# Patient Record
Sex: FEMALE | Race: WHITE | NOT HISPANIC OR LATINO
[De-identification: names, ages, dates, MRNs, and addresses within clinical notes are randomized per-mention and may not be internally consistent; named-entity substitution may affect disease eponyms.]

---

## 2024-04-30 ENCOUNTER — APPOINTMENT (OUTPATIENT)
Dept: OTOLARYNGOLOGY | Facility: CLINIC | Age: 34
End: 2024-04-30
Payer: COMMERCIAL

## 2024-04-30 VITALS
WEIGHT: 120 LBS | HEART RATE: 87 BPM | TEMPERATURE: 99.8 F | SYSTOLIC BLOOD PRESSURE: 119 MMHG | OXYGEN SATURATION: 98 % | BODY MASS INDEX: 19.99 KG/M2 | HEIGHT: 65 IN | DIASTOLIC BLOOD PRESSURE: 79 MMHG

## 2024-04-30 DIAGNOSIS — R09.89 OTHER SPECIFIED SYMPTOMS AND SIGNS INVOLVING THE CIRCULATORY AND RESPIRATORY SYSTEMS: ICD-10-CM

## 2024-04-30 DIAGNOSIS — H61.21 IMPACTED CERUMEN, RIGHT EAR: ICD-10-CM

## 2024-04-30 DIAGNOSIS — J30.9 ALLERGIC RHINITIS, UNSPECIFIED: ICD-10-CM

## 2024-04-30 DIAGNOSIS — Z78.9 OTHER SPECIFIED HEALTH STATUS: ICD-10-CM

## 2024-04-30 DIAGNOSIS — K21.9 GASTRO-ESOPHAGEAL REFLUX DISEASE W/OUT ESOPHAGITIS: ICD-10-CM

## 2024-04-30 DIAGNOSIS — R05.3 CHRONIC COUGH: ICD-10-CM

## 2024-04-30 PROBLEM — Z00.00 ENCOUNTER FOR PREVENTIVE HEALTH EXAMINATION: Status: ACTIVE | Noted: 2024-04-30

## 2024-04-30 PROCEDURE — 99205 OFFICE O/P NEW HI 60 MIN: CPT | Mod: 25

## 2024-04-30 PROCEDURE — 31231 NASAL ENDOSCOPY DX: CPT | Mod: 59

## 2024-04-30 PROCEDURE — 69210 REMOVE IMPACTED EAR WAX UNI: CPT | Mod: RT

## 2024-04-30 PROCEDURE — 31575 DIAGNOSTIC LARYNGOSCOPY: CPT

## 2024-04-30 RX ORDER — AZELASTINE HYDROCHLORIDE 137 UG/1
137 SPRAY, METERED NASAL TWICE DAILY
Qty: 3 | Refills: 3 | Status: ACTIVE | COMMUNITY
Start: 2024-04-30 | End: 1900-01-01

## 2024-04-30 RX ORDER — FLUTICASONE PROPIONATE 50 UG/1
50 SPRAY, METERED NASAL DAILY
Qty: 3 | Refills: 3 | Status: ACTIVE | COMMUNITY
Start: 2024-04-30 | End: 1900-01-01

## 2024-04-30 NOTE — PHYSICAL EXAM
[Nasal Endoscopy Performed] : nasal endoscopy was performed, see procedure section for findings [] : septum deviated to the right [Midline] : trachea located in midline position [Laryngoscopy Performed] : laryngoscopy was performed, see procedure section for findings [FreeTextEntry8] : partial cerumen impaction, removed with curette  [Normal] : tympanic membranes are normal in both ears [de-identified] : right sided cerumen removed without issue

## 2024-04-30 NOTE — ASSESSMENT
[FreeTextEntry1] : - 5/30/24 35yo female who presents with concern for chronic and persistent chest congestion along with cough.  I do believe that her symptoms are multifactorial.  Based on history and physical exam, I do believe there is a component of laryngopharyngeal reflux.  At this time I am recommending dietary and behavioral change to reduce acid in the diet.  I am also recommending omeprazole as well famotidine for a 3 months trial. I am also recommending regimented use of nasal saline, and nasal azelastine to help with PND.  Additionally we discussed increase hydration and cough avoidance.   I also believe that pulm consultation would be prudent.  Recommending consult with Eric/Bimal for pulm eval, pft and possible bronchoscopy.  - Dietary and behavioral modification to reduce acid reflux, handout given - Omeprazole qd as well as Famotidine qhs - Voice hygiene, increase hydration, sips of water throughout the day, avoid throat clearing - fu 3wk mo for eval of reflux -nasal saline rinses/flonase/azelastine - fu 6wks for eval of nasal symptoms -referral for pulmonology

## 2024-04-30 NOTE — PROCEDURE
[FreeTextEntry3] : - Cerumen Removal/Ear Cleaning for Otitis Externa Pre-operative Diagnosis: Right Cerumen Impaction Post-operative Diagnosis: Same Procedure:  Binocular microscopy with cerumen removal- [id number] Procedure Details:   The patient was placed in the supine position.  The operating microscope was positioned.  I then placed the ear speculum in the Right EAC.  Cerumen was then removed using a mixture of otologic curettes, and suction.  The TM was noted to be intact.  The patient tolerated procedure well.  Findings:  Bilateral Ear Canal - normal Bilateral Tympanic Membrane - normal  Recommendations: Debrox Complications: None  [FreeTextEntry6] : - Procedure Note    Pre-operative Diagnosis: PND Post-operative Diagnosis: +allergic rhinitis Anesthesia: Topical Procedure: Bilateral nasal endoscopy    Procedure Details:  After topical anesthesia and decongestant, the patient was placed in the supine position. The telescope was passed along the left nasal floor to the nasopharynx. It was then passed into the region of the middle meatus, middle turbinate, and the sphenoethmoid region.  An identical procedure was performed on the right side.     Findings:  Mucosa: 	                allergic rhinitis	 Nasal septum: 	midline 	 Discharge: 	none	 Turbinates: 	normal	 Adenoid: 	                normal	 Posterior choanae: 	normal	 Eustachian tubes: 	normal	 Mucous stranding: 	normal 	 Lesions: 	                Not present	    Comments:  Condition: Stable. Patient tolerated procedure well. Complications: None  [de-identified] : - Pre-operative Diagnosis:  Throat discomfort Post-operative Diagnosis:  + postcricoid edema, erythema vocal process Anesthesia: Topical - 1 % Lidocaine/Phenylephrine Procedure:  Flexible Laryngoscopy   Procedure Details:   The patient was placed in the sitting position.  After decongestant and anesthesia were applied the laryngoscope was passed.  The nasal cavities, nasopharynx, oropharynx, hypopharynx, and larynx were all examined.  Vocal folds were examined during respiration and phonation.  The following findings were noted:  Findings:   Nose: Septum is midline, turbinates are normal, nasal airways patent, mucosa normal Nasopharynx: Adenoids normal, no masses, eustachian tube normal Oropharynx: Pharyngeal walls symmetric and without lesion. Tonsils/fossae symmetric Hypopharynx: Hypopharynx and pyriform sinuses without lesion. No masses or asymmetry.  No pooling of secretions. Larynx:  Epiglottis and aryepiglottic folds were sharp and crisp bilaterally.  Bilateral false and true vocal folds normal appearance. Bilateral vocal folds fully mobile and symmetric.  Airway was widely patent. + postcricoid edema, erythema vocal process  Condition: Stable.  Patient tolerated procedure well.  Complications: None

## 2024-04-30 NOTE — HISTORY OF PRESENT ILLNESS
[de-identified] : - 4/30/24: 34yF p/w persistent chest congestion. Went to an ENT as a child, testing neg for CF and allergies. Reported that mucus was abnormally thick with sig enlarged adenoids. Had adenoids removed around age of 11, continued to have sx afterwards.   Occasional SOB and increased hoarseness. No issues chewing, eating, or swallowing although food can make it worse. No issues with taste or smell. Reports occasional clear nasal drainage and coughs up thick mucus, episodic. Frequent throat clearing. Regularly takes mucinex, reports helps sx. Took Nexium for about 6 months with no notable improvement. Has never seen a pulmonologist.    Diet: + 2.5c coffee, chocolate couple times a week, citrus twice a week, tomato, onion, seltzer, spicy foods, late night eating, 8-10c of water per day